# Patient Record
Sex: MALE | Race: WHITE | NOT HISPANIC OR LATINO | RURAL
[De-identification: names, ages, dates, MRNs, and addresses within clinical notes are randomized per-mention and may not be internally consistent; named-entity substitution may affect disease eponyms.]

---

## 2021-11-29 NOTE — PATIENT DISCUSSION
Based on today's exam, diagnostic studies, and review of records, the DETERMINATION WAS MADE FOR A VITRECTOMY with REMOVAL OF SUBLUXATED IOL . Discussed benefits, alternatives, and risks of surgery including (but not limited to) cataract (if natural lens is still present), infection, bleeding, retinal detachment, optic neuropathy, loss of vision, blindness, and loss of eye. Patient was told the vision may not return to the same level as prior to development of the membrane but should improve as the anatomy returns to a more normal contour. No prediction of the level of visual improvement and/or the degree of distortion reduction can be given.

## 2022-02-07 NOTE — PATIENT DISCUSSION
2GIVEN INTERFERING WITH VISION, RECOMMEND Vitrectomy and IOL explant WITH SECONDARY PCIOL implant -SUTURED- discussed (risks, benefits and alternatives discussed).

## 2022-03-07 NOTE — PATIENT DISCUSSION
2/7/22: /P SX BY DR TONEY ON 1/4/22 WITH AKREOS AND GORETEX SUTURES. WELL POSITIONED. CENTERED. CONT TO MONITOR. WILL SEND FOR A NEW RX AND ASSESS ASTIGMATISM. DEPENDING ON IMPROVEMENT, CONSIDER REMOVAL OF NYLON SUTURES.

## 2022-03-07 NOTE — PATIENT DISCUSSION
3/7/22: RESOLVED. RETINA IS FLAT. POST RX 20/30-  WITH ASTIGMATISM AND MYOPIA. NEEDS PRISMS TO AVOID DIPLOPIA. TO OBTAIN A NEW RX.

## 2023-04-28 ENCOUNTER — NEW PATIENT (OUTPATIENT)
Dept: RURAL CLINIC 2 | Facility: CLINIC | Age: 78
End: 2023-04-28

## 2023-04-28 DIAGNOSIS — H25.813: ICD-10-CM

## 2023-04-28 DIAGNOSIS — H52.13: ICD-10-CM

## 2023-04-28 DIAGNOSIS — H35.3132: ICD-10-CM

## 2023-04-28 DIAGNOSIS — H52.4: ICD-10-CM

## 2023-04-28 DIAGNOSIS — H43.813: ICD-10-CM

## 2023-04-28 DIAGNOSIS — H52.223: ICD-10-CM

## 2023-04-28 DIAGNOSIS — H40.013: ICD-10-CM

## 2023-04-28 PROCEDURE — 99204 OFFICE O/P NEW MOD 45 MIN: CPT

## 2023-04-28 PROCEDURE — 92015 DETERMINE REFRACTIVE STATE: CPT

## 2023-04-28 ASSESSMENT — TONOMETRY
OS_IOP_MMHG: 15
OD_IOP_MMHG: 17

## 2023-04-28 ASSESSMENT — VISUAL ACUITY
OS_CC: 20/40+2
OD_CC: 20/25+

## 2023-06-20 ENCOUNTER — EMERGENCY VISIT (OUTPATIENT)
Dept: RURAL CLINIC 2 | Facility: CLINIC | Age: 78
End: 2023-06-20

## 2023-06-20 DIAGNOSIS — H43.813: ICD-10-CM

## 2023-06-20 DIAGNOSIS — H25.812: ICD-10-CM

## 2023-06-20 DIAGNOSIS — H40.013: ICD-10-CM

## 2023-06-20 DIAGNOSIS — Z96.1: ICD-10-CM

## 2023-06-20 DIAGNOSIS — H35.3132: ICD-10-CM

## 2023-06-20 PROCEDURE — 92134 CPTRZ OPH DX IMG PST SGM RTA: CPT

## 2023-06-20 PROCEDURE — 99213 OFFICE O/P EST LOW 20 MIN: CPT

## 2023-06-20 ASSESSMENT — TONOMETRY
OD_IOP_MMHG: 16
OS_IOP_MMHG: 16

## 2023-06-20 ASSESSMENT — VISUAL ACUITY
OS_CC: 20/30-2
OD_CC: 20/30-1

## 2023-09-18 NOTE — PATIENT DISCUSSION
CMP and ESR results from 9/18/2023 sent via EMR to . GIVEN INTERFERING WITH VISION, RECOMMEND Vitrectomy and IOL explant WITH SECONDARY PCIOL implant -SUTURED- discussed (risks, benefits and alternatives discussed).

## 2023-10-31 ENCOUNTER — ESTABLISHED PATIENT (OUTPATIENT)
Dept: RURAL CLINIC 2 | Facility: CLINIC | Age: 78
End: 2023-10-31

## 2023-10-31 DIAGNOSIS — H25.812: ICD-10-CM

## 2023-10-31 DIAGNOSIS — H35.3132: ICD-10-CM

## 2023-10-31 DIAGNOSIS — Z96.1: ICD-10-CM

## 2023-10-31 DIAGNOSIS — H43.813: ICD-10-CM

## 2023-10-31 DIAGNOSIS — H40.013: ICD-10-CM

## 2023-10-31 PROCEDURE — 76514 ECHO EXAM OF EYE THICKNESS: CPT

## 2023-10-31 PROCEDURE — 99214 OFFICE O/P EST MOD 30 MIN: CPT

## 2023-10-31 ASSESSMENT — VISUAL ACUITY
OD_CC: 20/40+2
OS_PH: 20/30
OD_PH: 20/25-1
OS_CC: 20/40

## 2023-10-31 ASSESSMENT — TONOMETRY
OD_IOP_MMHG: 16
OS_IOP_MMHG: 15

## 2023-10-31 ASSESSMENT — PACHYMETRY
OD_CT_UM: 536
OS_CT_UM: 536

## 2023-12-13 ENCOUNTER — ESTABLISHED PATIENT (OUTPATIENT)
Dept: RURAL CLINIC 2 | Facility: CLINIC | Age: 78
End: 2023-12-13

## 2023-12-13 DIAGNOSIS — H43.813: ICD-10-CM

## 2023-12-13 DIAGNOSIS — H25.812: ICD-10-CM

## 2023-12-13 DIAGNOSIS — H35.3132: ICD-10-CM

## 2023-12-13 DIAGNOSIS — Z96.1: ICD-10-CM

## 2023-12-13 DIAGNOSIS — H40.013: ICD-10-CM

## 2023-12-13 PROCEDURE — 99213 OFFICE O/P EST LOW 20 MIN: CPT

## 2023-12-13 PROCEDURE — 92020 GONIOSCOPY: CPT

## 2023-12-13 PROCEDURE — 92083 EXTENDED VISUAL FIELD XM: CPT

## 2023-12-13 ASSESSMENT — VISUAL ACUITY
OS_PH: 20/40-1
OS_CC: 20/50-2
OD_CC: 20/25-2

## 2023-12-13 ASSESSMENT — TONOMETRY
OS_IOP_MMHG: 16
OD_IOP_MMHG: 16

## 2024-01-10 ENCOUNTER — CONSULTATION/EVALUATION (OUTPATIENT)
Dept: RURAL CLINIC 1 | Facility: CLINIC | Age: 79
End: 2024-01-10

## 2024-01-10 DIAGNOSIS — H25.812: ICD-10-CM

## 2024-01-10 PROCEDURE — 92134 CPTRZ OPH DX IMG PST SGM RTA: CPT

## 2024-01-10 PROCEDURE — 92025 CPTRIZED CORNEAL TOPOGRAPHY: CPT

## 2024-01-10 PROCEDURE — 92136 OPHTHALMIC BIOMETRY: CPT

## 2024-01-10 PROCEDURE — 99214 OFFICE O/P EST MOD 30 MIN: CPT

## 2024-01-10 ASSESSMENT — VISUAL ACUITY
OS_CC: 20/100+1
OD_CC: 20/30-1
OS_SC: 20/60-1
OS_CC: 20/50
OS_PH: 20/40
OS_SC: 20/50
OS_AM: 20/30

## 2024-01-10 ASSESSMENT — TONOMETRY
OS_IOP_MMHG: 14
OD_IOP_MMHG: 14

## 2024-01-15 ENCOUNTER — PRE-OP/H&P (OUTPATIENT)
Dept: RURAL CLINIC 1 | Facility: CLINIC | Age: 79
End: 2024-01-15

## 2024-01-15 VITALS
HEIGHT: 71 IN | HEART RATE: 62 BPM | SYSTOLIC BLOOD PRESSURE: 126 MMHG | BODY MASS INDEX: 28 KG/M2 | WEIGHT: 200 LBS | DIASTOLIC BLOOD PRESSURE: 82 MMHG

## 2024-01-15 DIAGNOSIS — I82.401: ICD-10-CM

## 2024-01-15 DIAGNOSIS — Z01.818: ICD-10-CM

## 2024-01-15 PROCEDURE — 99212 OFFICE O/P EST SF 10 MIN: CPT

## 2024-01-22 ENCOUNTER — SURGERY/PROCEDURE (OUTPATIENT)
Dept: URBAN - METROPOLITAN AREA SURGERY 3 | Facility: SURGERY | Age: 79
End: 2024-01-22

## 2024-01-22 DIAGNOSIS — H25.812: ICD-10-CM

## 2024-01-22 PROCEDURE — 66984 XCAPSL CTRC RMVL W/O ECP: CPT

## 2024-01-22 PROCEDURE — 68841 INSJ RX ELUT IMPLT LAC CANAL: CPT

## 2024-01-23 ENCOUNTER — POST-OP (OUTPATIENT)
Dept: RURAL CLINIC 1 | Facility: CLINIC | Age: 79
End: 2024-01-23

## 2024-01-23 DIAGNOSIS — Z96.1: ICD-10-CM

## 2024-01-23 PROCEDURE — 99024 POSTOP FOLLOW-UP VISIT: CPT

## 2024-01-23 ASSESSMENT — VISUAL ACUITY: OS_SC: 20/80

## 2024-01-23 ASSESSMENT — TONOMETRY: OS_IOP_MMHG: 16

## 2024-02-06 ENCOUNTER — POST-OP (OUTPATIENT)
Dept: RURAL CLINIC 2 | Facility: CLINIC | Age: 79
End: 2024-02-06

## 2024-02-06 DIAGNOSIS — Z96.1: ICD-10-CM

## 2024-02-06 PROCEDURE — 99024 POSTOP FOLLOW-UP VISIT: CPT

## 2024-02-06 ASSESSMENT — TONOMETRY: OS_IOP_MMHG: 15

## 2024-02-06 ASSESSMENT — VISUAL ACUITY: OS_SC: 20/25-2

## 2024-02-20 ENCOUNTER — POST-OP (OUTPATIENT)
Dept: RURAL CLINIC 2 | Facility: CLINIC | Age: 79
End: 2024-02-20

## 2024-02-20 DIAGNOSIS — Z96.1: ICD-10-CM

## 2024-02-20 PROCEDURE — 99024 POSTOP FOLLOW-UP VISIT: CPT

## 2024-02-20 ASSESSMENT — VISUAL ACUITY
OS_SC: 20/40+3
OD_SC: 20/30

## 2024-02-20 ASSESSMENT — TONOMETRY
OS_IOP_MMHG: 11
OD_IOP_MMHG: 17

## 2025-01-13 ENCOUNTER — COMPREHENSIVE EXAM (OUTPATIENT)
Age: 80
End: 2025-01-13

## 2025-01-13 DIAGNOSIS — H40.013: ICD-10-CM

## 2025-01-13 DIAGNOSIS — H43.813: ICD-10-CM

## 2025-01-13 DIAGNOSIS — H26.493: ICD-10-CM

## 2025-01-13 DIAGNOSIS — H52.203: ICD-10-CM

## 2025-01-13 DIAGNOSIS — H35.3132: ICD-10-CM

## 2025-01-13 DIAGNOSIS — H52.4: ICD-10-CM

## 2025-01-13 DIAGNOSIS — H52.11: ICD-10-CM

## 2025-01-13 PROCEDURE — 92014 COMPRE OPH EXAM EST PT 1/>: CPT

## 2025-01-13 PROCEDURE — 92015 DETERMINE REFRACTIVE STATE: CPT

## 2025-01-13 PROCEDURE — 92134 CPTRZ OPH DX IMG PST SGM RTA: CPT
